# Patient Record
Sex: FEMALE | Race: WHITE | NOT HISPANIC OR LATINO | URBAN - METROPOLITAN AREA
[De-identification: names, ages, dates, MRNs, and addresses within clinical notes are randomized per-mention and may not be internally consistent; named-entity substitution may affect disease eponyms.]

---
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
## 2024-05-07 DIAGNOSIS — Z79.3 ON ORAL CONTRACEPTIVE PILLS FOR NON-CONTRACEPTION INDICATION: ICD-10-CM

## 2024-05-07 RX ORDER — NORETHINDRONE ACETATE AND ETHINYL ESTRADIOL 1MG-20(21)
KIT ORAL
Qty: 84 TABLET | Refills: 3 | Status: SHIPPED | OUTPATIENT
Start: 2024-05-07

## 2024-07-25 ENCOUNTER — APPOINTMENT (OUTPATIENT)
Dept: OBSTETRICS AND GYNECOLOGY | Facility: CLINIC | Age: 19
End: 2024-07-25
Payer: COMMERCIAL

## 2024-07-25 VITALS
HEIGHT: 64 IN | BODY MASS INDEX: 18.78 KG/M2 | WEIGHT: 110 LBS | DIASTOLIC BLOOD PRESSURE: 60 MMHG | SYSTOLIC BLOOD PRESSURE: 92 MMHG

## 2024-07-25 DIAGNOSIS — Z11.3 SCREEN FOR STD (SEXUALLY TRANSMITTED DISEASE): ICD-10-CM

## 2024-07-25 DIAGNOSIS — N92.1 BREAKTHROUGH BLEEDING ON OCPS: Primary | ICD-10-CM

## 2024-07-25 PROCEDURE — 1036F TOBACCO NON-USER: CPT | Performed by: NURSE PRACTITIONER

## 2024-07-25 PROCEDURE — 3008F BODY MASS INDEX DOCD: CPT | Performed by: NURSE PRACTITIONER

## 2024-07-25 PROCEDURE — 99214 OFFICE O/P EST MOD 30 MIN: CPT | Performed by: NURSE PRACTITIONER

## 2024-07-25 PROCEDURE — 87661 TRICHOMONAS VAGINALIS AMPLIF: CPT

## 2024-07-25 PROCEDURE — 87491 CHLMYD TRACH DNA AMP PROBE: CPT

## 2024-07-25 PROCEDURE — 87591 N.GONORRHOEAE DNA AMP PROB: CPT

## 2024-07-25 RX ORDER — NORGESTIMATE AND ETHINYL ESTRADIOL 0.25-0.035
1 KIT ORAL DAILY
Qty: 84 TABLET | Refills: 3 | Status: SHIPPED | OUTPATIENT
Start: 2024-07-25 | End: 2025-07-25

## 2024-07-25 ASSESSMENT — ENCOUNTER SYMPTOMS
CONSTITUTIONAL NEGATIVE: 1
EYES NEGATIVE: 1
ENDOCRINE NEGATIVE: 1
CARDIOVASCULAR NEGATIVE: 1
GASTROINTESTINAL NEGATIVE: 1
PSYCHIATRIC NEGATIVE: 1
MUSCULOSKELETAL NEGATIVE: 1
NEUROLOGICAL NEGATIVE: 1
RESPIRATORY NEGATIVE: 1

## 2024-07-25 NOTE — PROGRESS NOTES
Subjective   Patient ID: Melly Hart is a 19 y.o. female who presents for Vaginal Bleeding (Patient states her period started 7/16 and has not stopped. /States March, 25 2023 she started to have irregular cycles. ).  19 year old here for complaints of having breakthrough bleeding with her ocp.  She notes that she has been having 2 periods a month and neither one is coming during the sugar pills.  She had bleeding 4 days the beginning of July and then again bleeding started on July 16 and continues today.  She denies missing pills or being late in taking them.  She is sexually active but denies any pain, discharge and urinary changes.  She previously had an issue where she was taking the pils and she had a little to no period in September 2023, but since then there has been no pattern.  It is mostly heavier and more often but each month the heaviness, length of time it lasts and when it occurs is different.  She desires to stay on birth control pills only.  She was taking oral doxycycline for acne.  She started this about 1-2 weeks ago and stopped it 2 days ago due to nausea.         Review of Systems   Constitutional: Negative.    HENT: Negative.     Eyes: Negative.    Respiratory: Negative.     Cardiovascular: Negative.    Gastrointestinal: Negative.    Endocrine: Negative.    Genitourinary:  Positive for menstrual problem.   Musculoskeletal: Negative.    Skin: Negative.    Neurological: Negative.    Psychiatric/Behavioral: Negative.         Objective   Physical Exam  Vitals reviewed.   Constitutional:       Appearance: Normal appearance. She is well-developed.   Pulmonary:      Effort: Pulmonary effort is normal. No respiratory distress.   Chest:   Breasts:     Breasts are symmetrical.      Right: Normal. No swelling, bleeding, inverted nipple, mass, nipple discharge, skin change or tenderness.      Left: Normal. No swelling, bleeding, inverted nipple, mass, nipple discharge, skin change or tenderness.    Abdominal:      Palpations: Abdomen is soft.   Genitourinary:     General: Normal vulva.      Exam position: Lithotomy position.      Pubic Area: No rash.       Labia:         Right: No rash, tenderness, lesion or injury.         Left: No rash, tenderness, lesion or injury.       Urethra: No prolapse, urethral pain, urethral swelling or urethral lesion.      Vagina: Normal. No vaginal discharge.      Cervix: Normal.      Uterus: Normal.       Adnexa: Right adnexa normal and left adnexa normal.      Rectum: Normal.   Musculoskeletal:         General: Normal range of motion.   Lymphadenopathy:      Upper Body:      Right upper body: No supraclavicular, axillary or pectoral adenopathy.      Left upper body: No supraclavicular, axillary or pectoral adenopathy.   Skin:     General: Skin is warm and dry.   Neurological:      General: No focal deficit present.      Mental Status: She is alert and oriented to person, place, and time. Mental status is at baseline.   Psychiatric:         Attention and Perception: Attention and perception normal.         Mood and Affect: Mood and affect normal.         Speech: Speech normal.         Behavior: Behavior normal. Behavior is cooperative.         Thought Content: Thought content normal.         Judgment: Judgment normal.         Assessment/Plan   Problem List Items Addressed This Visit             ICD-10-CM    Breakthrough bleeding on OCPs - Primary N92.1    Relevant Medications    norgestimate-ethinyl estradioL (Sprintec, 28,) 0.25-35 mg-mcg tablet   Oral contraceptives were discussed.  The risks and benefits were presented to the patient including the risk for VTE's and an increased risk with smoking. Patient stated understanding and desires use of OCP.  Additional use of condoms encouraged to patient to prevent STI's.   Discontinue aurovela, begin sprintec  Reviewed if no change in menses or improvement after first month can chane to patch or nuva ring.    Follow up as  needed       Kayla Wright, LISETTE-CNP 07/25/24 3:24 PM

## 2024-07-26 LAB
C TRACH RRNA SPEC QL NAA+PROBE: NEGATIVE
N GONORRHOEA DNA SPEC QL PROBE+SIG AMP: NEGATIVE
T VAGINALIS RRNA SPEC QL NAA+PROBE: NEGATIVE

## 2024-09-15 ENCOUNTER — OFFICE VISIT (OUTPATIENT)
Dept: URGENT CARE | Age: 19
End: 2024-09-15
Payer: COMMERCIAL

## 2024-09-15 VITALS
HEART RATE: 72 BPM | OXYGEN SATURATION: 98 % | DIASTOLIC BLOOD PRESSURE: 73 MMHG | SYSTOLIC BLOOD PRESSURE: 101 MMHG | RESPIRATION RATE: 16 BRPM | TEMPERATURE: 98.2 F

## 2024-09-15 DIAGNOSIS — J40 BRONCHITIS: Primary | ICD-10-CM

## 2024-09-15 DIAGNOSIS — J02.9 SORE THROAT: ICD-10-CM

## 2024-09-15 LAB — POC RAPID STREP: NEGATIVE

## 2024-09-15 RX ORDER — AZITHROMYCIN 250 MG/1
TABLET, FILM COATED ORAL
Qty: 6 TABLET | Refills: 0 | Status: SHIPPED | OUTPATIENT
Start: 2024-09-15 | End: 2024-09-20

## 2024-09-15 RX ORDER — BENZONATATE 100 MG/1
100 CAPSULE ORAL 3 TIMES DAILY PRN
Qty: 42 CAPSULE | Refills: 0 | Status: SHIPPED | OUTPATIENT
Start: 2024-09-15 | End: 2024-10-15

## 2024-09-15 RX ORDER — METHYLPREDNISOLONE 4 MG/1
TABLET ORAL
Qty: 21 TABLET | Refills: 0 | Status: SHIPPED | OUTPATIENT
Start: 2024-09-15 | End: 2024-09-22

## 2024-09-15 RX ORDER — ALBUTEROL SULFATE 90 UG/1
2 INHALANT RESPIRATORY (INHALATION) EVERY 4 HOURS PRN
Qty: 6.7 G | Refills: 0 | Status: SHIPPED | OUTPATIENT
Start: 2024-09-15 | End: 2025-09-15

## 2024-09-15 ASSESSMENT — ENCOUNTER SYMPTOMS
CHILLS: 1
FATIGUE: 1
NAUSEA: 0
FEVER: 1
VOMITING: 0
RHINORRHEA: 1
COUGH: 1
SORE THROAT: 1
SINUS PRESSURE: 1

## 2024-09-15 NOTE — PROGRESS NOTES
Subjective   Patient ID: Melly Hart is a 19 y.o. female. They present today with a chief complaint of Sore Throat (Congestion , and sore throat x 2 weeks ).    History of Present Illness  Patient has been ill for 2 weeks with nasal congestion cough malaise fevers and chills.  Her cough has been productive of yellow sputum.  She denies shortness of breath or chest pain.  She is here today to get a strep test because she has had a sore throat for the last 3 days and she feels she may have been exposed to strep.  Denies nausea vomiting.  Denies any other symptoms.      Sore Throat   Associated symptoms include congestion and coughing. Pertinent negatives include no ear pain or vomiting.       Past Medical History  Allergies as of 09/15/2024    (No Known Allergies)       (Not in a hospital admission)       Past Medical History:   Diagnosis Date    Encounter for general adult medical examination without abnormal findings 07/11/2019    Encounter for preventive health examination       Past Surgical History:   Procedure Laterality Date    TONSILLECTOMY  06/12/2014    Tonsillectomy With Adenoidectomy        reports that she has never smoked. She has never been exposed to tobacco smoke. She has never used smokeless tobacco. She reports that she does not drink alcohol and does not use drugs.    Review of Systems  Review of Systems   Constitutional:  Positive for chills, fatigue and fever.   HENT:  Positive for congestion, postnasal drip, rhinorrhea, sinus pressure and sore throat. Negative for ear pain.    Respiratory:  Positive for cough.    Cardiovascular:  Negative for chest pain.   Gastrointestinal:  Negative for nausea and vomiting.                                  Objective    Vitals:    09/15/24 1350   BP: 101/73   Pulse: 72   Resp: 16   Temp: 36.8 °C (98.2 °F)   SpO2: 98%     No LMP recorded.    Physical Exam  Constitutional:       General: She is not in acute distress.     Appearance: Normal appearance. She is  normal weight. She is not toxic-appearing.   HENT:      Head: Normocephalic and atraumatic.      Right Ear: Tympanic membrane, ear canal and external ear normal.      Left Ear: Tympanic membrane, ear canal and external ear normal.      Nose: Congestion present.      Mouth/Throat:      Mouth: Mucous membranes are moist.      Pharynx: Oropharynx is clear. No oropharyngeal exudate or posterior oropharyngeal erythema.   Eyes:      Extraocular Movements: Extraocular movements intact.      Conjunctiva/sclera: Conjunctivae normal.      Pupils: Pupils are equal, round, and reactive to light.   Cardiovascular:      Rate and Rhythm: Normal rate and regular rhythm.   Pulmonary:      Effort: Pulmonary effort is normal. No respiratory distress.      Breath sounds: Normal breath sounds. No stridor. No wheezing, rhonchi or rales.   Musculoskeletal:      Cervical back: Normal range of motion and neck supple.   Neurological:      General: No focal deficit present.      Mental Status: She is alert and oriented to person, place, and time.   Psychiatric:         Mood and Affect: Mood normal.         Behavior: Behavior normal.         Thought Content: Thought content normal.         Judgment: Judgment normal.         Procedures    Point of Care Test & Imaging Results from this visit  No results found for this visit on 09/15/24.   No results found.    Diagnostic study results (if any) were reviewed by LYNDA Hendrickson.    Assessment/Plan   Allergies, medications, history, and pertinent labs/EKGs/Imaging reviewed by LYNDA Hendrickson.     Medical Decision Making  This patient has been ill for 2 weeks.  She has had many different symptoms but are respiratory in nature.  She had a negative strep test here.  Her biggest complaint really is cough with yellow sputum.  Will treat her for an bronchitis.  There    Orders and Diagnoses  Diagnoses and all orders for this visit:  Sore throat  -     POCT rapid strep A manually  resulted      Medical Admin Record      Follow Up Instructions  No follow-ups on file.    Patient disposition: Home    Electronically signed by LYNDA Hendrickson  2:16 PM

## 2024-10-02 DIAGNOSIS — N92.1 BREAKTHROUGH BLEEDING ON OCPS: ICD-10-CM

## 2024-10-02 RX ORDER — NORGESTIMATE AND ETHINYL ESTRADIOL 0.25-0.035
1 KIT ORAL DAILY
Qty: 84 TABLET | Refills: 3 | Status: SHIPPED | OUTPATIENT
Start: 2024-10-02 | End: 2025-10-02

## 2024-10-02 NOTE — TELEPHONE ENCOUNTER
Patient asking for  norgestimate-ethinyl estradioL (Sprintec, 28,) 0.25-35 mg-mcg tablet to be sent to [facility] mail service

## 2025-01-13 DIAGNOSIS — N92.1 BREAKTHROUGH BLEEDING ON OCPS: ICD-10-CM

## 2025-01-13 RX ORDER — NORGESTIMATE AND ETHINYL ESTRADIOL 0.25-0.035
1 KIT ORAL DAILY
Qty: 84 TABLET | Refills: 3 | Status: SHIPPED | OUTPATIENT
Start: 2025-01-13 | End: 2026-01-13

## 2025-01-13 NOTE — TELEPHONE ENCOUNTER
Patient is requesting new prescription for Sprintec to be sent to [facility] for 90 day supply

## 2025-01-26 ENCOUNTER — OFFICE VISIT (OUTPATIENT)
Dept: URGENT CARE | Age: 20
End: 2025-01-26
Payer: COMMERCIAL

## 2025-01-26 VITALS
TEMPERATURE: 98.3 F | OXYGEN SATURATION: 98 % | HEART RATE: 86 BPM | DIASTOLIC BLOOD PRESSURE: 76 MMHG | SYSTOLIC BLOOD PRESSURE: 125 MMHG

## 2025-01-26 DIAGNOSIS — J01.10 ACUTE NON-RECURRENT FRONTAL SINUSITIS: Primary | ICD-10-CM

## 2025-01-26 PROCEDURE — 99213 OFFICE O/P EST LOW 20 MIN: CPT | Performed by: PHYSICIAN ASSISTANT

## 2025-01-26 RX ORDER — AMOXICILLIN AND CLAVULANATE POTASSIUM 875; 125 MG/1; MG/1
1 TABLET, FILM COATED ORAL 2 TIMES DAILY
Qty: 14 TABLET | Refills: 0 | Status: SHIPPED | OUTPATIENT
Start: 2025-01-26 | End: 2025-02-02

## 2025-01-26 ASSESSMENT — ENCOUNTER SYMPTOMS
CHEST TIGHTNESS: 0
FEVER: 0
WHEEZING: 0
SORE THROAT: 1
SHORTNESS OF BREATH: 0
FATIGUE: 0
CHILLS: 0
RHINORRHEA: 1
COUGH: 1
SINUS PRESSURE: 1

## 2025-01-26 NOTE — PROGRESS NOTES
Subjective   Patient ID: Melly Hart is a 19 y.o. female. They present today with a chief complaint of Cough (Sore throat).    History of Present Illness  Patient presents for illness symptoms for the past week. She states at first she had fevers and chills, but hasn't had this since Thursday. She has had sore throat, nasal congestion, runny nose, sinus pressure, cough, ear pressure. She denies chest tightness, wheezing, SOB, ear pain. No history of asthma. She has tried OTC NyQuil, DayQuil, pain relievers and cough drops.      Cough  Associated symptoms include rhinorrhea and a sore throat. Pertinent negatives include no chills, ear pain, fever, shortness of breath or wheezing.       Past Medical History  Allergies as of 01/26/2025    (No Known Allergies)       (Not in a hospital admission)       Past Medical History:   Diagnosis Date    Encounter for general adult medical examination without abnormal findings 07/11/2019    Encounter for preventive health examination       Past Surgical History:   Procedure Laterality Date    TONSILLECTOMY  06/12/2014    Tonsillectomy With Adenoidectomy        reports that she has never smoked. She has never been exposed to tobacco smoke. She has never used smokeless tobacco. She reports that she does not drink alcohol and does not use drugs.    Review of Systems  Review of Systems   Constitutional:  Negative for chills, fatigue and fever.   HENT:  Positive for congestion, rhinorrhea, sinus pressure and sore throat. Negative for ear discharge and ear pain.    Respiratory:  Positive for cough. Negative for chest tightness, shortness of breath and wheezing.                                   Objective    Vitals:    01/26/25 0946   BP: 125/76   Pulse: 86   Temp: 36.8 °C (98.3 °F)   SpO2: 98%     No LMP recorded.    Physical Exam  Constitutional:       General: She is not in acute distress.     Appearance: Normal appearance. She is ill-appearing.   HENT:      Right Ear: Tympanic  membrane, ear canal and external ear normal.      Left Ear: Tympanic membrane, ear canal and external ear normal.      Nose: Congestion and rhinorrhea present.      Right Sinus: Frontal sinus tenderness present.      Left Sinus: Frontal sinus tenderness present.      Mouth/Throat:      Pharynx: No pharyngeal swelling, oropharyngeal exudate or posterior oropharyngeal erythema.      Tonsils: No tonsillar exudate or tonsillar abscesses.   Cardiovascular:      Rate and Rhythm: Normal rate and regular rhythm.      Pulses: Normal pulses.      Heart sounds: Normal heart sounds.   Pulmonary:      Effort: Pulmonary effort is normal. No respiratory distress.      Breath sounds: Normal breath sounds. No wheezing, rhonchi or rales.   Lymphadenopathy:      Cervical: Cervical adenopathy present.   Neurological:      Mental Status: She is alert and oriented to person, place, and time.         Procedures    Point of Care Test & Imaging Results from this visit  No results found for this visit on 01/26/25.   No results found.    Diagnostic study results (if any) were reviewed by Victoria Villatoro PA-C.    Assessment/Plan   Allergies, medications, history, and pertinent labs/EKGs/Imaging reviewed by Victoria Villatoro PA-C.     Medical Decision Making  Patient presents with signs and symptoms consistent with sinusitis. Given length of symptoms and lack of improvement with OTC symptomatic treatment, it seemed reasonable to treat with course of antibiotics. Discussed may continue with OTC symptomatic relief. Discussed to follow up with PCP if symptoms are not improving over the next 3-5 days, earlier with any acute worsening.     Orders and Diagnoses  Diagnoses and all orders for this visit:  Acute non-recurrent frontal sinusitis  -     amoxicillin-pot clavulanate (Augmentin) 875-125 mg tablet; Take 1 tablet by mouth 2 times a day for 7 days.      Medical Admin Record      Patient disposition: Home    Electronically signed by Victoria Villatoro  ANASTASIA  10:00 AM

## 2025-03-06 ENCOUNTER — TELEPHONE (OUTPATIENT)
Dept: OBSTETRICS AND GYNECOLOGY | Facility: CLINIC | Age: 20
End: 2025-03-06
Payer: COMMERCIAL

## 2025-03-06 NOTE — TELEPHONE ENCOUNTER
"I contacted patient to see if I can offer her a sooner appointment with Allie. Patient states she is wanting to switch her care to Elena. She states \" My mom is a patient of Elena's and we want her to be my provider since she is aware of our family history. I want to cancel my upcoming appointment with Allie. \" Per patients request I forwarded her call to the [place] office to get her scheduled.   "

## 2025-03-06 NOTE — TELEPHONE ENCOUNTER
Patient is scheduled 03/18/2025 with Allie but would like to be seen sooner the note on her appointment says irregular periods and severe cramping     Message came through the scheduling department in box

## 2025-03-17 RX ORDER — NORGESTIMATE AND ETHINYL ESTRADIOL 0.25-0.035
1 KIT ORAL DAILY
Qty: 84 TABLET | Refills: 3 | Status: CANCELLED | OUTPATIENT
Start: 2025-03-17 | End: 2026-03-17

## 2025-03-18 ENCOUNTER — APPOINTMENT (OUTPATIENT)
Dept: OBSTETRICS AND GYNECOLOGY | Facility: CLINIC | Age: 20
End: 2025-03-18
Payer: COMMERCIAL

## 2025-03-18 VITALS — DIASTOLIC BLOOD PRESSURE: 78 MMHG | WEIGHT: 117 LBS | BODY MASS INDEX: 20.08 KG/M2 | SYSTOLIC BLOOD PRESSURE: 122 MMHG

## 2025-03-18 DIAGNOSIS — Z12.4 CERVICAL CANCER SCREENING: ICD-10-CM

## 2025-03-18 DIAGNOSIS — N92.1 BREAKTHROUGH BLEEDING ON OCPS: ICD-10-CM

## 2025-03-18 DIAGNOSIS — N94.6 DYSMENORRHEA: ICD-10-CM

## 2025-03-18 DIAGNOSIS — Z11.3 SCREEN FOR STD (SEXUALLY TRANSMITTED DISEASE): ICD-10-CM

## 2025-03-18 DIAGNOSIS — Z01.419 WELL WOMAN EXAM WITH ROUTINE GYNECOLOGICAL EXAM: Primary | ICD-10-CM

## 2025-03-18 DIAGNOSIS — N93.9 ABNORMAL UTERINE BLEEDING (AUB): ICD-10-CM

## 2025-03-18 DIAGNOSIS — R10.2 PELVIC PAIN IN FEMALE: ICD-10-CM

## 2025-03-18 PROCEDURE — 99395 PREV VISIT EST AGE 18-39: CPT | Performed by: NURSE PRACTITIONER

## 2025-03-18 PROCEDURE — 99213 OFFICE O/P EST LOW 20 MIN: CPT | Performed by: NURSE PRACTITIONER

## 2025-03-18 RX ORDER — DROSPIRENONE, ETHINYL ESTRADIOL AND LEVOMEFOLATE CALCIUM AND LEVOMEFOLATE CALCIUM 3-0.02(24)
1 KIT ORAL DAILY
Qty: 84 TABLET | Refills: 3 | Status: SHIPPED | OUTPATIENT
Start: 2025-03-18

## 2025-03-18 SDOH — ECONOMIC STABILITY: FOOD INSECURITY: WITHIN THE PAST 12 MONTHS, YOU WORRIED THAT YOUR FOOD WOULD RUN OUT BEFORE YOU GOT MONEY TO BUY MORE.: NEVER TRUE

## 2025-03-18 SDOH — ECONOMIC STABILITY: FOOD INSECURITY: WITHIN THE PAST 12 MONTHS, THE FOOD YOU BOUGHT JUST DIDN'T LAST AND YOU DIDN'T HAVE MONEY TO GET MORE.: NEVER TRUE

## 2025-03-18 SDOH — ECONOMIC STABILITY: TRANSPORTATION INSECURITY
IN THE PAST 12 MONTHS, HAS LACK OF TRANSPORTATION KEPT YOU FROM MEETINGS, WORK, OR FROM GETTING THINGS NEEDED FOR DAILY LIVING?: NO

## 2025-03-18 SDOH — ECONOMIC STABILITY: TRANSPORTATION INSECURITY
IN THE PAST 12 MONTHS, HAS THE LACK OF TRANSPORTATION KEPT YOU FROM MEDICAL APPOINTMENTS OR FROM GETTING MEDICATIONS?: NO

## 2025-03-18 NOTE — PROGRESS NOTES
Due for annual, persisting AUB.     Subjective   Melly Hart is a 19 y.o. female who is here for a routine exam. Periods are irregular, lasting  varies some menses 3-4 days, some 7-10  days. At the heaviest,  soaking super tampon in 1 hour.   Dysmenorrhea:severe, occurring premenstrually and throughout menses. Cyclic symptoms include anxiety. No intermenstrual bleeding, spotting, or discharge.  Pt has been on sprintec for 4 months bleeding the 4th week and 1st week of the new pack. Prior to this, Previous failed Junel 1/20 and generic of 1/20.   Current contraception: OCP (estrogen/progesterone)      History of abnormal Pap smear: no  Family history of uterine or ovarian cancer: no  Regular self breast exam: no  History of abnormal mammogram: no  Family history of breast cancer: no    Last pap: n/a    Review of Systems:    Constitutional: Negative.    HENT: Negative.     Eyes: Negative.    Respiratory: Negative.     Cardiovascular: Negative.    Gastrointestinal: Negative.    Endocrine: Negative.    Genitourinary: Negative.    Musculoskeletal: Negative.    Skin: Negative.    Allergic/Immunologic: Negative.    Neurological: Negative.    Hematological: Negative.    Psychiatric/Behavioral: Negative.       EXAM:   Constitutional; alert with no acute distress.  Well-nourished.      Neck: No asymmetry noted.  Thyroid without enlargement.  No palpable nodules or masses of concern.    Cardiovascular: Heart regular rate and rhythm, normal S1 and S2    Pulmonary: No respiratory distress, lungs clear to auscultate bilaterally    Chest/breast exam: Appearance bilaterally normal, without asymmetry of concern, without skin lesions or nipple discharge.  Palpation of the breast-no palpable masses no lymphadenopathy of the axilla.    Abdomen: Soft, nontender, no abdominal masses palpated    Genitourinary:  Palpation of the lymph nodes of the groin-no inguinal lymphadenopathy  External genitalia/perianal: Without lesions normal in  appearance   Urethra: In appearance without lesions Bladder: non-tender to palpate  Vagina: Without lesions including [place]tholin, urethra, [place] glands within normal limits all WNL   Cervix: Normal in appearance without lesions, no cervical motion tenderness to palpation  Uterus: Without enlargement, mobile, nontender to palpate  Bilateral adnexa:  without masses, nontender to palpate    Skin: Normal skin color and pigmentation    Psychiatric: Alert and oriented x3. Affect normal to patient baseline.  Mood: appropriate       Assessment/Plan   Diagnoses and all orders for this visit:  Well woman exam with routine gynecological exam  -     THINPREP PAP  Breakthrough bleeding on OCPs  Screen for STD (sexually transmitted disease)  Pelvic pain in female  -     US PELVIS TRANSABDOMINAL WITH TRANSVAGINAL; Future  -     drospirenone-e.estradioL-lm.FA (Liliane Lizama) 3-0.02-0.451 mg (24) (4); Take 1 tablet by mouth once daily.  Dysmenorrhea  -     US PELVIS TRANSABDOMINAL WITH TRANSVAGINAL; Future  -     drospirenone-e.estradioL-lm.FA (Liliane Lizama) 3-0.02-0.451 mg (24) (4); Take 1 tablet by mouth once daily.  Abnormal uterine bleeding (AUB)  -     TSH with reflex to Free T4 if abnormal; Future  -     CBC; Future  -     Prolactin; Future  -     US PELVIS TRANSABDOMINAL WITH TRANSVAGINAL; Future  -     drospirenone-e.estradioL-lm.FA (Liliane Lizama) 3-0.02-0.451 mg (24) (4); Take 1 tablet by mouth once daily.  Cervical cancer screening  -     THINPREP PAP      No follow-ups on file.     Pap plan: every 3 years if WNL  STI screening recommended annually, pt accepts   Gardasil Discussed  Contraception options discussed with patient   RTO for annual exam in 1 year   Breast screening discussed    Pt advised or r/b of contraceptions.   Discussed concerns specific to increased r/b blood clots, heart attacks and strokes.   Advised to report sx of concern including ACHES.  Transient concerns of irregular menses, spotting, breast  tenderness and mild headaches reviewed. Specifically she has failed two other OCPs and will trial Beyaz with concerns for failure to COCs. Consider IUD, nexplanon, Patch and the Nuvaring. Pt given pamphlets to consider other options.   Pt verbalized understanding of the risks.     AUB:  Ultrasound ordered  Follow up in 3 months if AUB persists.   Labs ordered.       Madhuri Daley, APRN-CNM, APRN-CNP

## 2025-03-19 LAB
ERYTHROCYTE [DISTWIDTH] IN BLOOD BY AUTOMATED COUNT: 11.5 % (ref 11–15)
HCT VFR BLD AUTO: 42 % (ref 35–45)
HGB BLD-MCNC: 14.2 G/DL (ref 11.7–15.5)
MCH RBC QN AUTO: 30.1 PG (ref 27–33)
MCHC RBC AUTO-ENTMCNC: 33.8 G/DL (ref 32–36)
MCV RBC AUTO: 89 FL (ref 80–100)
PLATELET # BLD AUTO: 260 THOUSAND/UL (ref 140–400)
PMV BLD REES-ECKER: 11.2 FL (ref 7.5–12.5)
PROLACTIN SERPL-MCNC: 6 NG/ML
RBC # BLD AUTO: 4.72 MILLION/UL (ref 3.8–5.1)
TSH SERPL-ACNC: 1.14 MIU/L
WBC # BLD AUTO: 7.1 THOUSAND/UL (ref 3.8–10.8)

## 2025-03-31 ENCOUNTER — HOSPITAL ENCOUNTER (OUTPATIENT)
Dept: RADIOLOGY | Facility: HOSPITAL | Age: 20
Discharge: HOME | End: 2025-03-31
Payer: COMMERCIAL

## 2025-03-31 DIAGNOSIS — N93.9 ABNORMAL UTERINE BLEEDING (AUB): ICD-10-CM

## 2025-03-31 DIAGNOSIS — N94.6 DYSMENORRHEA: ICD-10-CM

## 2025-03-31 DIAGNOSIS — R10.2 PELVIC PAIN IN FEMALE: ICD-10-CM

## 2025-03-31 PROCEDURE — 76856 US EXAM PELVIC COMPLETE: CPT | Performed by: RADIOLOGY

## 2025-03-31 PROCEDURE — 76830 TRANSVAGINAL US NON-OB: CPT

## 2025-03-31 PROCEDURE — 76830 TRANSVAGINAL US NON-OB: CPT | Performed by: RADIOLOGY

## 2025-04-04 ENCOUNTER — TELEPHONE (OUTPATIENT)
Dept: OBSTETRICS AND GYNECOLOGY | Facility: CLINIC | Age: 20
End: 2025-04-04
Payer: COMMERCIAL

## 2025-04-04 LAB
CYTOLOGY CMNT CVX/VAG CYTO-IMP: NORMAL
LAB AP HPV GENOTYPE QUESTION: YES
LAB AP HPV HR: NORMAL
LABORATORY COMMENT REPORT: NORMAL
PATH REPORT.TOTAL CANCER: NORMAL

## 2025-04-04 NOTE — TELEPHONE ENCOUNTER
----- Message from Madhuri Daley sent at 4/4/2025 12:14 PM EDT -----  BV on pap, if she has sx we can treat.   Elena

## 2025-04-08 ENCOUNTER — TELEPHONE (OUTPATIENT)
Dept: OBSTETRICS AND GYNECOLOGY | Facility: CLINIC | Age: 20
End: 2025-04-08
Payer: COMMERCIAL

## 2025-04-08 DIAGNOSIS — N76.0 BV (BACTERIAL VAGINOSIS): ICD-10-CM

## 2025-04-08 DIAGNOSIS — B96.89 BV (BACTERIAL VAGINOSIS): ICD-10-CM

## 2025-04-08 RX ORDER — METRONIDAZOLE 500 MG/1
500 TABLET ORAL 2 TIMES DAILY
Qty: 10 TABLET | Refills: 0 | Status: SHIPPED | OUTPATIENT
Start: 2025-04-08 | End: 2025-04-13

## 2025-06-24 ENCOUNTER — APPOINTMENT (OUTPATIENT)
Dept: OBSTETRICS AND GYNECOLOGY | Facility: CLINIC | Age: 20
End: 2025-06-24
Payer: COMMERCIAL

## 2025-06-30 ENCOUNTER — TELEPHONE (OUTPATIENT)
Dept: OBSTETRICS AND GYNECOLOGY | Facility: CLINIC | Age: 20
End: 2025-06-30
Payer: COMMERCIAL

## 2025-06-30 DIAGNOSIS — N94.6 DYSMENORRHEA: ICD-10-CM

## 2025-06-30 DIAGNOSIS — R10.2 PELVIC PAIN IN FEMALE: ICD-10-CM

## 2025-06-30 DIAGNOSIS — N93.9 ABNORMAL UTERINE BLEEDING (AUB): ICD-10-CM

## 2025-06-30 RX ORDER — DROSPIRENONE, ETHINYL ESTRADIOL AND LEVOMEFOLATE CALCIUM AND LEVOMEFOLATE CALCIUM 3-0.02(24)
1 KIT ORAL DAILY
Qty: 84 TABLET | Refills: 2 | Status: SHIPPED | OUTPATIENT
Start: 2025-06-30

## 2025-06-30 NOTE — TELEPHONE ENCOUNTER
Patient uses [facility] for drospirenone estradiol medication and was supposed to start the new pack yesterday, but she never received it and states that she has been having issues with mailservice sending scripts to her. For right now, patient is requesting this be sent to the [facility] in [place] so that she can  asap and avoid delayed start.

## 2025-07-17 ENCOUNTER — TELEPHONE (OUTPATIENT)
Dept: OBSTETRICS AND GYNECOLOGY | Facility: CLINIC | Age: 20
End: 2025-07-17

## 2025-07-17 DIAGNOSIS — N94.6 DYSMENORRHEA: ICD-10-CM

## 2025-07-17 DIAGNOSIS — R10.2 PELVIC PAIN IN FEMALE: ICD-10-CM

## 2025-07-17 DIAGNOSIS — N93.9 ABNORMAL UTERINE BLEEDING (AUB): ICD-10-CM

## 2025-07-17 RX ORDER — DROSPIRENONE, ETHINYL ESTRADIOL AND LEVOMEFOLATE CALCIUM AND LEVOMEFOLATE CALCIUM 3-0.02(24)
1 KIT ORAL DAILY
Qty: 84 TABLET | Refills: 2 | Status: SHIPPED | OUTPATIENT
Start: 2025-07-17